# Patient Record
Sex: MALE | Race: WHITE | NOT HISPANIC OR LATINO | Employment: FULL TIME | URBAN - METROPOLITAN AREA
[De-identification: names, ages, dates, MRNs, and addresses within clinical notes are randomized per-mention and may not be internally consistent; named-entity substitution may affect disease eponyms.]

---

## 2023-04-10 ENCOUNTER — TRANSFERRED RECORDS (OUTPATIENT)
Dept: HEALTH INFORMATION MANAGEMENT | Facility: CLINIC | Age: 48
End: 2023-04-10

## 2023-06-08 ENCOUNTER — MEDICAL CORRESPONDENCE (OUTPATIENT)
Dept: HEALTH INFORMATION MANAGEMENT | Facility: CLINIC | Age: 48
End: 2023-06-08
Payer: COMMERCIAL

## 2023-06-21 ENCOUNTER — TRANSCRIBE ORDERS (OUTPATIENT)
Dept: OTHER | Age: 48
End: 2023-06-21

## 2023-06-21 DIAGNOSIS — S43.431A SUPERIOR LABRUM ANTERIOR-TO-POSTERIOR (SLAP) TEAR OF RIGHT SHOULDER: Primary | ICD-10-CM

## 2023-06-21 DIAGNOSIS — S43.432A SUPERIOR LABRUM ANTERIOR-TO-POSTERIOR (SLAP) TEAR OF LEFT SHOULDER: Primary | ICD-10-CM

## 2025-04-03 ENCOUNTER — TELEPHONE (OUTPATIENT)
Dept: ORTHOPEDICS | Facility: CLINIC | Age: 50
End: 2025-04-03
Payer: COMMERCIAL

## 2025-04-03 NOTE — TELEPHONE ENCOUNTER
CHARLES LVM for patient offering to help schedule an appointment with Dr. Zenon Stern for his shoulder per Dr. Stern's request.     ATC provided call back number of [phone].    CHARLES Delaney

## 2025-04-22 NOTE — TELEPHONE ENCOUNTER
DIAGNOSIS:   Superior labrum anterior-to-posterior (SLAP) tear of right shoulder [S43.431A]  - Primary   APPOINTMENT DATE: 05/02/2025   NOTES STATUS DETAILS   OFFICE NOTE from referring provider Care Everywhere Desi Templeton MD  2014 [facility]   MRI In process Ratyus:  05/25/2023 - RT Shoulder   XRAYS (IMAGES & REPORTS) In process Rayus:  05/25/2023 - RT Shoulder

## 2025-04-27 ENCOUNTER — HEALTH MAINTENANCE LETTER (OUTPATIENT)
Age: 50
End: 2025-04-27

## 2025-05-01 DIAGNOSIS — M25.511 RIGHT SHOULDER PAIN: Primary | ICD-10-CM

## 2025-05-02 ENCOUNTER — OFFICE VISIT (OUTPATIENT)
Dept: ORTHOPEDICS | Facility: CLINIC | Age: 50
End: 2025-05-02
Payer: COMMERCIAL

## 2025-05-02 ENCOUNTER — ANCILLARY PROCEDURE (OUTPATIENT)
Dept: GENERAL RADIOLOGY | Facility: CLINIC | Age: 50
End: 2025-05-02
Attending: ORTHOPAEDIC SURGERY
Payer: COMMERCIAL

## 2025-05-02 ENCOUNTER — PRE VISIT (OUTPATIENT)
Dept: ORTHOPEDICS | Facility: CLINIC | Age: 50
End: 2025-05-02

## 2025-05-02 DIAGNOSIS — G89.29 CHRONIC RIGHT SHOULDER PAIN: Primary | ICD-10-CM

## 2025-05-02 DIAGNOSIS — M25.511 RIGHT SHOULDER PAIN: ICD-10-CM

## 2025-05-02 DIAGNOSIS — M25.511 CHRONIC RIGHT SHOULDER PAIN: Primary | ICD-10-CM

## 2025-05-02 PROCEDURE — 99204 OFFICE O/P NEW MOD 45 MIN: CPT | Performed by: ORTHOPAEDIC SURGERY

## 2025-05-02 PROCEDURE — 73030 X-RAY EXAM OF SHOULDER: CPT | Mod: RT | Performed by: RADIOLOGY

## 2025-05-02 PROCEDURE — 1125F AMNT PAIN NOTED PAIN PRSNT: CPT | Performed by: ORTHOPAEDIC SURGERY

## 2025-05-02 NOTE — LETTER
5/2/2025      Tip Bell  [address] [place] [id number]      Dear Colleague,    Thank you for referring your patient, Tip Bell, to the [facility] [address] [place]. Please see a copy of my visit note below.    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  He has had symptoms on and off for 5 years.  In November he was bench pressing and felt a pop with increased pain.  His pain is anterior.  Radiates into his biceps.  It is worse with activity.  He does have some mechanical symptoms.  He has not had a dislocation or subluxation.  His shoulder does not feel unstable to him.  He has tried physical therapy.  He has had a corticosteroid injection.    He denies significant neck pain.  Denies numbness or tingling.    PAST MEDICAL HISTORY: (Reviewed with the patient and in the [facility] medical record)  Hypertension    PAST SURGICAL HISTORY: (Reviewed with the patient and in the [facility] medical record)  Back surgery    MEDICATIONS: (Reviewed with the patient and in the [facility] medical record)    ALLERGIES: (Reviewed with the patient and in the [facility] medical record)  No known drug allergies      SOCIAL HISTORY: (Reviewed with the patient and in the medical record)  --Tobacco: None  --Occupation:   --Sport: Weightlifting    FAMILY HISTORY: (Reviewed with the patient and in the medical record)  -- No family history of bleeding, clotting, or difficulty with anesthesia    REVIEW OF SYSTEMS: (Reviewed with the patient and on the health intake form)  -- A comprehensive 10 point review of systems was conducted and is negative except as noted in the HPI    PHYSICAL EXAMINATION:     General: Awake, Alert and Oriented, No acute Distress. Articulate and Interactive    Cervical spine non-tender, full range of motion. The Spurling test is negative.    Sensation intact  to touch lateral arm, lateral forearm, and digits.  Deltoid, biceps, triceps, and distal hand musculature fire  Pulses 2+ and capillary refill is brisk    Shoulder Examination:  On symmetrical range of motion of the shoulder.  Nontender at the AC joint.  Nontender over the rotator cuff.  Markedly tender in the biceps groove.  5/5 rotator cuff strength.    Negative crossarm.  Positive Glendale's.  Positive speeds.  Positive Yergason's.  Slightly positive Neer.  Positive Torre.  Negative apprehension.  Negative posterior apprehension.    IMAGING:    Plain Radiographs: We reviewed patient's radiographs and radiographic report from today.  The glenohumeral joint is well located.  Cartilage space is well-maintained.  There is no obvious fracture or loose body noted.    MRI: I reviewed the patient's MRI and MRI report from May 25, 2023.  The patient does have a high grade partial-thickness rotator cuff tear of the supraspinatus.  There is degenerative tearing of the superior and posterior superior labrum.  There does appear to be tearing into the biceps tendon.  There is chondral wear of the glenohumeral joint.    IMPRESSION:  Tip is a very pleasant 49-year-old active male with a long history of right shoulder pain.  He has partial-thickness rotator cuff tearing and degenerative labral tearing as well as some chondral wear.  I do think this is resulting in inflammation of his biceps tendon.  His AC joint is asymptomatic.  We had a long conversation today about options.  We discussed continued nonsurgical as well as surgical intervention.  He is a bit frustrated.  Quite limited by shoulder pain and this failed prolonged physical therapy and injections.  We discussed operative intervention.  I explained that we would evaluate the shoulder arthroscopically and determine the extent of involvement of his rotator cuff.  If his rotator cuff is involved more than 50% of the thickness we would most likely perform a rotator  cuff repair.  Otherwise we would perform a debridement.  We would also debride the degenerative labral tearing and perform an open subpectoral biceps tenodesis.  I drew pictures to illustrate the procedure.  We discussed the surgical risks including bleeding infection nerve damage complications from anesthesia blood clot etc.  We also discussed the more pertinent risks with this type of surgery including failure to relieve his pain.  We may make his pain worse.  He may lose range of motion or develop scar tissue that could be problematic.  There could be implant complications.  There is a possibility that he ends up with a biceps deformity.  I did explain that the surgery does not reverse arthritis.  This may progress over time and further treatment may be necessary.  His questions were answered.  He understands and would like to proceed.    PLAN:  We will schedule him for right shoulder arthroscopy rotator cuff repair versus debridement, labral debridement and open subpectoral biceps tenodesis.  This will be done in the beachchair position as a same-day surgical procedure.  Trace anesthesia.    CC: Desi Owens MD    Again, thank you for allowing me to participate in the care of your patient.        Sincerely,        Zenon Stern MD    Electronically signed

## 2025-05-10 NOTE — PROGRESS NOTES
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  He has had symptoms on and off for 5 years.  In November he was bench pressing and felt a pop with increased pain.  His pain is anterior.  Radiates into his biceps.  It is worse with activity.  He does have some mechanical symptoms.  He has not had a dislocation or subluxation.  His shoulder does not feel unstable to him.  He has tried physical therapy.  He has had a corticosteroid injection.    He denies significant neck pain.  Denies numbness or tingling.    PAST MEDICAL HISTORY: (Reviewed with the patient and in the [facility] medical record)  Hypertension    PAST SURGICAL HISTORY: (Reviewed with the patient and in the [facility] medical record)  Back surgery    MEDICATIONS: (Reviewed with the patient and in the [facility] medical record)    ALLERGIES: (Reviewed with the patient and in the [facility] medical record)  No known drug allergies      SOCIAL HISTORY: (Reviewed with the patient and in the medical record)  --Tobacco: None  --Occupation:   --Sport: Weightlifting    FAMILY HISTORY: (Reviewed with the patient and in the medical record)  -- No family history of bleeding, clotting, or difficulty with anesthesia    REVIEW OF SYSTEMS: (Reviewed with the patient and on the health intake form)  -- A comprehensive 10 point review of systems was conducted and is negative except as noted in the HPI    PHYSICAL EXAMINATION:     General: Awake, Alert and Oriented, No acute Distress. Articulate and Interactive    Cervical spine non-tender, full range of motion. The Spurling test is negative.    Sensation intact to touch lateral arm, lateral forearm, and digits.  Deltoid, biceps, triceps, and distal hand musculature fire  Pulses 2+ and capillary refill is brisk    Shoulder Examination:  On symmetrical range of motion of the shoulder.  Nontender at the AC joint.   Nontender over the rotator cuff.  Markedly tender in the biceps groove.  5/5 rotator cuff strength.    Negative crossarm.  Positive Cameron's.  Positive speeds.  Positive Yergason's.  Slightly positive Neer.  Positive Torre.  Negative apprehension.  Negative posterior apprehension.    IMAGING:    Plain Radiographs: We reviewed patient's radiographs and radiographic report from today.  The glenohumeral joint is well located.  Cartilage space is well-maintained.  There is no obvious fracture or loose body noted.    MRI: I reviewed the patient's MRI and MRI report from May 25, 2023.  The patient does have a high grade partial-thickness rotator cuff tear of the supraspinatus.  There is degenerative tearing of the superior and posterior superior labrum.  There does appear to be tearing into the biceps tendon.  There is chondral wear of the glenohumeral joint.    IMPRESSION:  Tip is a very pleasant 49-year-old active male with a long history of right shoulder pain.  He has partial-thickness rotator cuff tearing and degenerative labral tearing as well as some chondral wear.  I do think this is resulting in inflammation of his biceps tendon.  His AC joint is asymptomatic.  We had a long conversation today about options.  We discussed continued nonsurgical as well as surgical intervention.  He is a bit frustrated.  Quite limited by shoulder pain and this failed prolonged physical therapy and injections.  We discussed operative intervention.  I explained that we would evaluate the shoulder arthroscopically and determine the extent of involvement of his rotator cuff.  If his rotator cuff is involved more than 50% of the thickness we would most likely perform a rotator cuff repair.  Otherwise we would perform a debridement.  We would also debride the degenerative labral tearing and perform an open subpectoral biceps tenodesis.  I drew pictures to illustrate the procedure.  We discussed the surgical risks including  bleeding infection nerve damage complications from anesthesia blood clot etc.  We also discussed the more pertinent risks with this type of surgery including failure to relieve his pain.  We may make his pain worse.  He may lose range of motion or develop scar tissue that could be problematic.  There could be implant complications.  There is a possibility that he ends up with a biceps deformity.  I did explain that the surgery does not reverse arthritis.  This may progress over time and further treatment may be necessary.  His questions were answered.  He understands and would like to proceed.    PLAN:  We will schedule him for right shoulder arthroscopy rotator cuff repair versus debridement, labral debridement and open subpectoral biceps tenodesis.  This will be done in the beachchair position as a same-day surgical procedure.  Trace anesthesia.    CC: Desi Owens MD

## 2025-05-12 ENCOUNTER — MYC MEDICAL ADVICE (OUTPATIENT)
Dept: ORTHOPEDICS | Facility: CLINIC | Age: 50
End: 2025-05-12
Payer: COMMERCIAL

## 2025-05-14 ENCOUNTER — DOCUMENTATION ONLY (OUTPATIENT)
Dept: ORTHOPEDICS | Facility: CLINIC | Age: 50
End: 2025-05-14
Payer: COMMERCIAL

## 2025-05-14 DIAGNOSIS — Z98.890 S/P ARTHROSCOPY OF RIGHT SHOULDER: Primary | ICD-10-CM

## 2025-05-14 NOTE — PROGRESS NOTES
Teaching Flowsheet     Visit Type: In Clinic during visit on 5/2/2025 with Dr. Stern.     Time Start: 9:20am   Time End: 9:40am  Total Time Spent: 20 min.    Surgeon: Dr. Zenon Stern   Location of Surgery (known or anticipated): [facility]  or [facility]  Type of Anesthesia: General  Worker's Compensation Procedure: No    Pertinent Medical History: Previous Lumbar Surgery 2024  Were medical conditions reviewed and appropriate for location? Yes  BMI: Normal BMI    Relevant Diagnosis: Right Shoulder Rotator Cuff Tear  Teaching Topic: Right shoulder arthroscopy with open subpectoral biceps tenodesis, rotator cuff repair vs debridement,     Person(s) involved in teaching:   Patient  : No.   Verified Patient's Phone Number: YES: [phone]    Caregiver//  Name: Devan Bell  Phone Number: [phone]   Relationship: Father  Consent to Communicate on file: Yes  Authorization to Share Protected Health Information- Person to person communication signed by patient and authorized the following person or people:      Motivation Level:  Asks Questions: Yes  Eager to Learn: Yes  Cooperative: Yes  Receptive (willing/able to accept information): Yes  Any cultural factors/Taoism beliefs that may influence understanding or compliance? No     Patient demonstrates understanding of the following:  Reason for the appointment, diagnosis and treatment plan: Yes  Knowledge of proper use of medications and conditions for which they are ordered (with special attention to potential side effects or drug interactions): Yes  Which situations necessitate calling provider and whom to contact: Yes     Teaching Concerns Addressed:   Proper use and care of medical equip, care aids, etc.: Yes  Nutritional needs and diet plan: Yes  Pain management techniques: Yes  Wound Care: Yes  How and/when to access community resources: Yes  Need for pre-op with in 30 days: YES, will be done with PCP. I asked them to  ensure they go over their daily medications during this visit and discuss what medications need to be stopped before surgery and when. If you are doing a pre-op with your PCP and they are not within the [facility], I ask them to fax it to our pre-op office. Patient verbalized understanding.       Does patient have difficulty getting a ride to appointments (post-ops, PT/OT): No  Patient's plan after discharge: home with family or spouse     Instructional Materials Used/Given:  two bottles of chlorhexidine soap and a surgery packet given to patient in clinic. Instructed patient to buy or get two 8 ounces bottles of antiseptic surgical soap called 4% CHG. Common name brand of this soap are Hibiclens and Exidine. I told them they can find this at their local pharmacy, clinic or retail store. If they have trouble finding it, I told them to ask their pharmacist to help them find a substitute.   - Important Contact Info/Phone Numbers: emphasizing clinic number [phone] and after hours number [phone]  - Map/location of surgery and follow-up appointments  - Showering instructions  - Stoplight Tool     -Next step: surgery scheduled for 6/11/2025 and schedule a pre-op with PCP.  - Patient will schedule pre-op with PCP at [facility].  - Patient will schedule physical therapy at [facility] in [place], [place] with Blanka Gomez.    Elaina Huffman, ATC

## 2025-05-19 ENCOUNTER — MYC MEDICAL ADVICE (OUTPATIENT)
Dept: ORTHOPEDICS | Facility: CLINIC | Age: 50
End: 2025-05-19
Payer: COMMERCIAL

## 2025-05-29 NOTE — TELEPHONE ENCOUNTER
Patient is scheduled for surgery with Dr. Stern    Spoke with: patient     Reason for Surgical Date: patient choice    Date of Surgery: 6/11/25    Estimated Arrival time Discussed with Patient:  No    [facility]     Pre-Op H&P: 6/2/[facility]    Imaging: No     Additional Appointments: No     Post-Op Appt Date w/ NP/PA: to be scheduled by care team    Post-Op Appt Date w/ Surgeon: to be scheduled by care team     Discussed with patient PAC RN will provide arrival time and instructions for surgery at the time of the appointment: [[place] locations only]: Yes    Standard Surgery Packet Sent: Yes 05/29/25  via [email or web address] Message    Additional Comments: N/A    Yessica Valerio on 5/29/2025 at 12:53 PM

## 2025-06-09 RX ORDER — METHOCARBAMOL 750 MG/1
750 TABLET, FILM COATED ORAL 4 TIMES DAILY PRN
Status: ON HOLD | COMMUNITY
End: 2025-06-11

## 2025-06-09 RX ORDER — LORATADINE 10 MG/1
10 TABLET ORAL DAILY PRN
COMMUNITY

## 2025-06-09 RX ORDER — LOSARTAN POTASSIUM 50 MG/1
50 TABLET ORAL DAILY
COMMUNITY

## 2025-06-10 ENCOUNTER — ANESTHESIA EVENT (OUTPATIENT)
Dept: SURGERY | Facility: CLINIC | Age: 50
End: 2025-06-10
Payer: COMMERCIAL

## 2025-06-11 ENCOUNTER — HOSPITAL ENCOUNTER (OUTPATIENT)
Facility: CLINIC | Age: 50
Discharge: HOME | End: 2025-06-11
Attending: ORTHOPAEDIC SURGERY | Admitting: ORTHOPAEDIC SURGERY
Payer: COMMERCIAL

## 2025-06-11 ENCOUNTER — ANESTHESIA (OUTPATIENT)
Dept: SURGERY | Facility: CLINIC | Age: 50
End: 2025-06-11
Payer: COMMERCIAL

## 2025-06-11 VITALS
TEMPERATURE: 97.5 F | OXYGEN SATURATION: 98 % | DIASTOLIC BLOOD PRESSURE: 97 MMHG | BODY MASS INDEX: 31.78 KG/M2 | HEIGHT: 70 IN | WEIGHT: 222 LBS | SYSTOLIC BLOOD PRESSURE: 146 MMHG | HEART RATE: 59 BPM | RESPIRATION RATE: 15 BRPM

## 2025-06-11 DIAGNOSIS — Z98.890 S/P SHOULDER SURGERY: Primary | ICD-10-CM

## 2025-06-11 PROBLEM — N18.30 CRF (CHRONIC RENAL FAILURE), STAGE 3 (MODERATE) (H): Status: ACTIVE | Noted: 2020-08-27

## 2025-06-11 PROBLEM — D22.9 MULTIPLE BENIGN NEVI: Status: ACTIVE | Noted: 2020-08-27

## 2025-06-11 PROBLEM — C43.9 MALIGNANT MELANOMA, UNSPECIFIED SITE (H): Status: ACTIVE | Noted: 2020-08-27

## 2025-06-11 PROCEDURE — 360N000077 HC SURGERY LEVEL 4, PER MIN: Performed by: ORTHOPAEDIC SURGERY

## 2025-06-11 PROCEDURE — 258N000003 HC RX IP 258 OP 636: Performed by: NURSE ANESTHETIST, CERTIFIED REGISTERED

## 2025-06-11 PROCEDURE — 29827 SHO ARTHRS SRG RT8TR CUF RPR: CPT | Mod: RT | Performed by: ORTHOPAEDIC SURGERY

## 2025-06-11 PROCEDURE — 250N000009 HC RX 250: Performed by: ORTHOPAEDIC SURGERY

## 2025-06-11 PROCEDURE — 29823 SHO ARTHRS SRG XTNSV DBRDMT: CPT | Mod: RT | Performed by: ORTHOPAEDIC SURGERY

## 2025-06-11 PROCEDURE — 250N000013 HC RX MED GY IP 250 OP 250 PS 637: Performed by: STUDENT IN AN ORGANIZED HEALTH CARE EDUCATION/TRAINING PROGRAM

## 2025-06-11 PROCEDURE — 250N000011 HC RX IP 250 OP 636: Performed by: NURSE ANESTHETIST, CERTIFIED REGISTERED

## 2025-06-11 PROCEDURE — 272N000002 HC OR SUPPLY OTHER OPNP: Performed by: ORTHOPAEDIC SURGERY

## 2025-06-11 PROCEDURE — 250N000011 HC RX IP 250 OP 636: Performed by: ORTHOPAEDIC SURGERY

## 2025-06-11 PROCEDURE — 250N000011 HC RX IP 250 OP 636: Performed by: STUDENT IN AN ORGANIZED HEALTH CARE EDUCATION/TRAINING PROGRAM

## 2025-06-11 PROCEDURE — 250N000009 HC RX 250: Performed by: NURSE ANESTHETIST, CERTIFIED REGISTERED

## 2025-06-11 PROCEDURE — 710N000010 HC RECOVERY PHASE 1, LEVEL 2, PER MIN: Performed by: ORTHOPAEDIC SURGERY

## 2025-06-11 PROCEDURE — 999N000141 HC STATISTIC PRE-PROCEDURE NURSING ASSESSMENT: Performed by: ORTHOPAEDIC SURGERY

## 2025-06-11 PROCEDURE — 710N000012 HC RECOVERY PHASE 2, PER MINUTE: Performed by: ORTHOPAEDIC SURGERY

## 2025-06-11 PROCEDURE — C1713 ANCHOR/SCREW BN/BN,TIS/BN: HCPCS | Performed by: ORTHOPAEDIC SURGERY

## 2025-06-11 PROCEDURE — 250N000009 HC RX 250: Performed by: STUDENT IN AN ORGANIZED HEALTH CARE EDUCATION/TRAINING PROGRAM

## 2025-06-11 PROCEDURE — 258N000001 HC RX 258: Performed by: ORTHOPAEDIC SURGERY

## 2025-06-11 PROCEDURE — 370N000017 HC ANESTHESIA TECHNICAL FEE, PER MIN: Performed by: ORTHOPAEDIC SURGERY

## 2025-06-11 PROCEDURE — 272N000001 HC OR GENERAL SUPPLY STERILE: Performed by: ORTHOPAEDIC SURGERY

## 2025-06-11 PROCEDURE — 23430 REPAIR BICEPS TENDON: CPT | Mod: RT | Performed by: ORTHOPAEDIC SURGERY

## 2025-06-11 PROCEDURE — 258N000003 HC RX IP 258 OP 636: Performed by: STUDENT IN AN ORGANIZED HEALTH CARE EDUCATION/TRAINING PROGRAM

## 2025-06-11 PROCEDURE — 271N000001 HC OR GENERAL SUPPLY NON-STERILE: Performed by: ORTHOPAEDIC SURGERY

## 2025-06-11 DEVICE — DBL LOADED 4.75MM BIO-COMP SWVLK
Type: IMPLANTABLE DEVICE | Site: SHOULDER | Status: FUNCTIONAL
Brand: ARTHREX®

## 2025-06-11 DEVICE — KNEE FIBERTAK BUTTON
Type: IMPLANTABLE DEVICE | Site: SHOULDER | Status: FUNCTIONAL
Brand: ARTHREX®

## 2025-06-11 DEVICE — KL FBTK RC, (WH/BLK)TT & (BLU)#2 MTS
Type: IMPLANTABLE DEVICE | Site: SHOULDER | Status: FUNCTIONAL
Brand: ARTHREX®

## 2025-06-11 DEVICE — KL FBTK RC W/ (BLU)FT & (W/BLK)#2 MTS
Type: IMPLANTABLE DEVICE | Site: SHOULDER | Status: FUNCTIONAL
Brand: ARTHREX®

## 2025-06-11 RX ORDER — LABETALOL 20 MG/4 ML (5 MG/ML) INTRAVENOUS SYRINGE
PRN
Status: DISCONTINUED | OUTPATIENT
Start: 2025-06-11 | End: 2025-06-11

## 2025-06-11 RX ORDER — HYDROMORPHONE HYDROCHLORIDE 1 MG/ML
0.2 INJECTION, SOLUTION INTRAMUSCULAR; INTRAVENOUS; SUBCUTANEOUS EVERY 5 MIN PRN
Status: DISCONTINUED | OUTPATIENT
Start: 2025-06-11 | End: 2025-06-11 | Stop reason: HOSPADM

## 2025-06-11 RX ORDER — PROPOFOL 10 MG/ML
INJECTION, EMULSION INTRAVENOUS PRN
Status: DISCONTINUED | OUTPATIENT
Start: 2025-06-11 | End: 2025-06-11

## 2025-06-11 RX ORDER — NALOXONE HYDROCHLORIDE 0.4 MG/ML
0.4 INJECTION, SOLUTION INTRAMUSCULAR; INTRAVENOUS; SUBCUTANEOUS
Status: DISCONTINUED | OUTPATIENT
Start: 2025-06-11 | End: 2025-06-11 | Stop reason: HOSPADM

## 2025-06-11 RX ORDER — LIDOCAINE HYDROCHLORIDE 20 MG/ML
INJECTION, SOLUTION INFILTRATION; PERINEURAL PRN
Status: DISCONTINUED | OUTPATIENT
Start: 2025-06-11 | End: 2025-06-11

## 2025-06-11 RX ORDER — NALOXONE HYDROCHLORIDE 0.4 MG/ML
0.1 INJECTION, SOLUTION INTRAMUSCULAR; INTRAVENOUS; SUBCUTANEOUS
Status: DISCONTINUED | OUTPATIENT
Start: 2025-06-11 | End: 2025-06-11 | Stop reason: HOSPADM

## 2025-06-11 RX ORDER — HYDROXYZINE HYDROCHLORIDE 25 MG/1
25 TABLET, FILM COATED ORAL
Status: CANCELLED | OUTPATIENT
Start: 2025-06-11

## 2025-06-11 RX ORDER — ONDANSETRON 2 MG/ML
INJECTION INTRAMUSCULAR; INTRAVENOUS PRN
Status: DISCONTINUED | OUTPATIENT
Start: 2025-06-11 | End: 2025-06-11

## 2025-06-11 RX ORDER — ONDANSETRON 4 MG/1
4 TABLET, ORALLY DISINTEGRATING ORAL EVERY 8 HOURS PRN
Qty: 8 TABLET | Refills: 0 | Status: SHIPPED | OUTPATIENT
Start: 2025-06-11

## 2025-06-11 RX ORDER — ACETAMINOPHEN 325 MG/1
650 TABLET ORAL EVERY 4 HOURS PRN
Qty: 50 TABLET | Refills: 0 | Status: SHIPPED | OUTPATIENT
Start: 2025-06-11

## 2025-06-11 RX ORDER — IBUPROFEN 600 MG/1
600 TABLET, FILM COATED ORAL EVERY 6 HOURS PRN
Qty: 30 TABLET | Refills: 0 | Status: SHIPPED | OUTPATIENT
Start: 2025-06-11

## 2025-06-11 RX ORDER — FENTANYL CITRATE 50 UG/ML
50 INJECTION, SOLUTION INTRAMUSCULAR; INTRAVENOUS EVERY 5 MIN PRN
Status: DISCONTINUED | OUTPATIENT
Start: 2025-06-11 | End: 2025-06-11 | Stop reason: HOSPADM

## 2025-06-11 RX ORDER — BUPIVACAINE HCL/EPINEPHRINE 0.25-.0005
VIAL (ML) INJECTION PRN
Status: DISCONTINUED | OUTPATIENT
Start: 2025-06-11 | End: 2025-06-11 | Stop reason: HOSPADM

## 2025-06-11 RX ORDER — OXYCODONE HYDROCHLORIDE 5 MG/1
5-10 TABLET ORAL EVERY 4 HOURS PRN
Qty: 26 TABLET | Refills: 0 | Status: SHIPPED | OUTPATIENT
Start: 2025-06-11

## 2025-06-11 RX ORDER — GABAPENTIN 100 MG/1
300 CAPSULE ORAL
Status: COMPLETED | OUTPATIENT
Start: 2025-06-11 | End: 2025-06-11

## 2025-06-11 RX ORDER — HYDROMORPHONE HYDROCHLORIDE 1 MG/ML
0.4 INJECTION, SOLUTION INTRAMUSCULAR; INTRAVENOUS; SUBCUTANEOUS EVERY 5 MIN PRN
Status: DISCONTINUED | OUTPATIENT
Start: 2025-06-11 | End: 2025-06-11 | Stop reason: HOSPADM

## 2025-06-11 RX ORDER — SODIUM CHLORIDE, SODIUM LACTATE, POTASSIUM CHLORIDE, CALCIUM CHLORIDE 600; 310; 30; 20 MG/100ML; MG/100ML; MG/100ML; MG/100ML
INJECTION, SOLUTION INTRAVENOUS CONTINUOUS
Status: DISCONTINUED | OUTPATIENT
Start: 2025-06-11 | End: 2025-06-11 | Stop reason: HOSPADM

## 2025-06-11 RX ORDER — ACETAMINOPHEN 325 MG/1
975 TABLET ORAL ONCE
Status: DISCONTINUED | OUTPATIENT
Start: 2025-06-11 | End: 2025-06-11 | Stop reason: HOSPADM

## 2025-06-11 RX ORDER — NALOXONE HYDROCHLORIDE 0.4 MG/ML
0.2 INJECTION, SOLUTION INTRAMUSCULAR; INTRAVENOUS; SUBCUTANEOUS
Status: DISCONTINUED | OUTPATIENT
Start: 2025-06-11 | End: 2025-06-11 | Stop reason: HOSPADM

## 2025-06-11 RX ORDER — ACETAMINOPHEN 325 MG/1
975 TABLET ORAL ONCE
Status: COMPLETED | OUTPATIENT
Start: 2025-06-11 | End: 2025-06-11

## 2025-06-11 RX ORDER — HYDRALAZINE HYDROCHLORIDE 20 MG/ML
2.5-5 INJECTION INTRAMUSCULAR; INTRAVENOUS EVERY 10 MIN PRN
Status: DISCONTINUED | OUTPATIENT
Start: 2025-06-11 | End: 2025-06-11 | Stop reason: HOSPADM

## 2025-06-11 RX ORDER — ONDANSETRON 4 MG/1
4 TABLET, ORALLY DISINTEGRATING ORAL
Status: CANCELLED | OUTPATIENT
Start: 2025-06-11

## 2025-06-11 RX ORDER — FLUMAZENIL 0.1 MG/ML
0.2 INJECTION, SOLUTION INTRAVENOUS
Status: DISCONTINUED | OUTPATIENT
Start: 2025-06-11 | End: 2025-06-11 | Stop reason: HOSPADM

## 2025-06-11 RX ORDER — CEFAZOLIN SODIUM/WATER 2 G/20 ML
2 SYRINGE (ML) INTRAVENOUS
Status: COMPLETED | OUTPATIENT
Start: 2025-06-11 | End: 2025-06-11

## 2025-06-11 RX ORDER — OXYCODONE HYDROCHLORIDE 5 MG/1
5 TABLET ORAL
Refills: 0 | Status: CANCELLED | OUTPATIENT
Start: 2025-06-11

## 2025-06-11 RX ORDER — HYDROXYZINE HYDROCHLORIDE 10 MG/1
10 TABLET, FILM COATED ORAL
Status: CANCELLED | OUTPATIENT
Start: 2025-06-11

## 2025-06-11 RX ORDER — AMOXICILLIN 250 MG
1-2 CAPSULE ORAL 2 TIMES DAILY
Qty: 30 TABLET | Refills: 0 | Status: SHIPPED | OUTPATIENT
Start: 2025-06-11

## 2025-06-11 RX ORDER — BUPIVACAINE HCL/EPINEPHRINE 0.5-1:200K
VIAL (ML) INJECTION
Status: COMPLETED | OUTPATIENT
Start: 2025-06-11 | End: 2025-06-11

## 2025-06-11 RX ORDER — CEFAZOLIN SODIUM/WATER 2 G/20 ML
2 SYRINGE (ML) INTRAVENOUS SEE ADMIN INSTRUCTIONS
Status: DISCONTINUED | OUTPATIENT
Start: 2025-06-11 | End: 2025-06-11 | Stop reason: HOSPADM

## 2025-06-11 RX ORDER — HYDROXYZINE PAMOATE 25 MG/1
25 CAPSULE ORAL 3 TIMES DAILY PRN
Qty: 30 CAPSULE | Refills: 0 | Status: SHIPPED | OUTPATIENT
Start: 2025-06-11

## 2025-06-11 RX ORDER — PROPOFOL 10 MG/ML
INJECTION, EMULSION INTRAVENOUS CONTINUOUS PRN
Status: DISCONTINUED | OUTPATIENT
Start: 2025-06-11 | End: 2025-06-11

## 2025-06-11 RX ORDER — FENTANYL CITRATE 50 UG/ML
INJECTION, SOLUTION INTRAMUSCULAR; INTRAVENOUS PRN
Status: DISCONTINUED | OUTPATIENT
Start: 2025-06-11 | End: 2025-06-11

## 2025-06-11 RX ORDER — HALOPERIDOL 5 MG/ML
1 INJECTION INTRAMUSCULAR
Status: DISCONTINUED | OUTPATIENT
Start: 2025-06-11 | End: 2025-06-11 | Stop reason: HOSPADM

## 2025-06-11 RX ORDER — LABETALOL HYDROCHLORIDE 5 MG/ML
10 INJECTION, SOLUTION INTRAVENOUS
Status: DISCONTINUED | OUTPATIENT
Start: 2025-06-11 | End: 2025-06-11 | Stop reason: HOSPADM

## 2025-06-11 RX ORDER — FENTANYL CITRATE 50 UG/ML
25-50 INJECTION, SOLUTION INTRAMUSCULAR; INTRAVENOUS
Status: DISCONTINUED | OUTPATIENT
Start: 2025-06-11 | End: 2025-06-11 | Stop reason: HOSPADM

## 2025-06-11 RX ORDER — FENTANYL CITRATE 50 UG/ML
25 INJECTION, SOLUTION INTRAMUSCULAR; INTRAVENOUS EVERY 5 MIN PRN
Status: DISCONTINUED | OUTPATIENT
Start: 2025-06-11 | End: 2025-06-11 | Stop reason: HOSPADM

## 2025-06-11 RX ORDER — LIDOCAINE 40 MG/G
CREAM TOPICAL
Status: DISCONTINUED | OUTPATIENT
Start: 2025-06-11 | End: 2025-06-11 | Stop reason: HOSPADM

## 2025-06-11 RX ORDER — IBUPROFEN 600 MG/1
600 TABLET, FILM COATED ORAL
Status: CANCELLED | OUTPATIENT
Start: 2025-06-11

## 2025-06-11 RX ORDER — SODIUM CHLORIDE, SODIUM LACTATE, POTASSIUM CHLORIDE, CALCIUM CHLORIDE 600; 310; 30; 20 MG/100ML; MG/100ML; MG/100ML; MG/100ML
INJECTION, SOLUTION INTRAVENOUS CONTINUOUS PRN
Status: DISCONTINUED | OUTPATIENT
Start: 2025-06-11 | End: 2025-06-11

## 2025-06-11 RX ADMIN — LABETALOL 20 MG/4 ML (5 MG/ML) INTRAVENOUS SYRINGE 10 MG: at 13:52

## 2025-06-11 RX ADMIN — DEXMEDETOMIDINE HYDROCHLORIDE 8 MCG: 100 INJECTION, SOLUTION INTRAVENOUS at 12:02

## 2025-06-11 RX ADMIN — Medication 2 G: at 12:02

## 2025-06-11 RX ADMIN — SODIUM CHLORIDE, SODIUM LACTATE, POTASSIUM CHLORIDE, AND CALCIUM CHLORIDE: .6; .31; .03; .02 INJECTION, SOLUTION INTRAVENOUS at 12:02

## 2025-06-11 RX ADMIN — PHENYLEPHRINE HYDROCHLORIDE 100 MCG: 10 INJECTION INTRAVENOUS at 12:48

## 2025-06-11 RX ADMIN — FENTANYL CITRATE 50 MCG: 50 INJECTION INTRAMUSCULAR; INTRAVENOUS at 10:27

## 2025-06-11 RX ADMIN — LABETALOL 20 MG/4 ML (5 MG/ML) INTRAVENOUS SYRINGE 10 MG: at 14:01

## 2025-06-11 RX ADMIN — LIDOCAINE HYDROCHLORIDE 100 MG: 20 INJECTION, SOLUTION INFILTRATION; PERINEURAL at 12:12

## 2025-06-11 RX ADMIN — ACETAMINOPHEN 975 MG: 325 TABLET ORAL at 09:40

## 2025-06-11 RX ADMIN — BUPIVACAINE 10 ML: 13.3 INJECTION, SUSPENSION, LIPOSOMAL INFILTRATION at 10:15

## 2025-06-11 RX ADMIN — DEXMEDETOMIDINE HYDROCHLORIDE 12 MCG: 100 INJECTION, SOLUTION INTRAVENOUS at 12:04

## 2025-06-11 RX ADMIN — GABAPENTIN 300 MG: 300 CAPSULE ORAL at 09:40

## 2025-06-11 RX ADMIN — PHENYLEPHRINE HYDROCHLORIDE 100 MCG: 10 INJECTION INTRAVENOUS at 12:37

## 2025-06-11 RX ADMIN — ONDANSETRON 4 MG: 2 INJECTION INTRAMUSCULAR; INTRAVENOUS at 13:56

## 2025-06-11 RX ADMIN — FENTANYL CITRATE 100 MCG: 50 INJECTION INTRAMUSCULAR; INTRAVENOUS at 12:12

## 2025-06-11 RX ADMIN — PHENYLEPHRINE HYDROCHLORIDE 0.3 MCG/KG/MIN: 10 INJECTION INTRAVENOUS at 13:02

## 2025-06-11 RX ADMIN — PROPOFOL 180 MG: 10 INJECTION, EMULSION INTRAVENOUS at 12:12

## 2025-06-11 RX ADMIN — PROPOFOL 200 MCG/KG/MIN: 10 INJECTION, EMULSION INTRAVENOUS at 12:12

## 2025-06-11 RX ADMIN — PHENYLEPHRINE HYDROCHLORIDE 100 MCG: 10 INJECTION INTRAVENOUS at 12:56

## 2025-06-11 RX ADMIN — BUPIVACAINE HYDROCHLORIDE AND EPINEPHRINE BITARTRATE 10 ML: 5; .005 INJECTION, SOLUTION PERINEURAL at 10:15

## 2025-06-11 RX ADMIN — MIDAZOLAM 1 MG: 1 INJECTION INTRAMUSCULAR; INTRAVENOUS at 10:27

## 2025-06-11 ASSESSMENT — ACTIVITIES OF DAILY LIVING (ADL)
ADLS_ACUITY_SCORE: 32

## 2025-06-11 ASSESSMENT — LIFESTYLE VARIABLES: TOBACCO_USE: 0

## 2025-06-11 NOTE — ANESTHESIA POSTPROCEDURE EVALUATION
Patient: iTp Bell    Procedure: Procedure(s):  ARTHROSCOPY, SHOULDER, WITH OPEN SUBPECTORAL BICEPS TENODESIS  ARTHROSCOPY, SHOULDER, WITH ROTATOR CUFF REPAIR WITH A GLENOHEMORAL JOINT DEBRIDEMENT       Anesthesia Type:  General    Note:  Disposition: Outpatient   Postop Pain Control: Uneventful            Sign Out: Well controlled pain   PONV: No   Neuro/Psych: Uneventful            Sign Out: Acceptable/Baseline neuro status   Airway/Respiratory: Uneventful            Sign Out: Acceptable/Baseline resp. status   CV/Hemodynamics: Uneventful            Sign Out: Acceptable CV status; No obvious hypovolemia; No obvious fluid overload   Other NRE: NONE   DID A NON-ROUTINE EVENT OCCUR? No           Last vitals:  Vitals Value Taken Time   /87 06/11/25 15:15   Temp 36.1  C (97  F) 06/11/25 14:30   Pulse 59 06/11/25 15:15   Resp 15 06/11/25 15:15   SpO2 99 % 06/11/25 15:18   Vitals shown include unfiled device data.    Electronically Signed By: Ayala Gómez MD  June 11, 2025  3:36 PM

## 2025-06-11 NOTE — DISCHARGE INSTRUCTIONS
To contact a doctor, call Dr. Stern's clinic at [phone]  or:     [phone] and ask for the Resident On Call for:          Orthopedics (answered 24 hours a day)   Emergency Departments:  [facility] Emergency Department: [phone]     [facility]'s Emergency Department: [phone]

## 2025-06-11 NOTE — OP NOTE
Procedure Date: 06/11/20225     PREOPERATIVE DIAGNOSES:    1.  Right shoulder superior labral pair  2   Right shoulder biceps tendinitis  3.  Right shoulder partial-thickness rotator cuff tear  4.  Right shoulder chondral wear     POSTOPERATIVE DIAGNOSES:    1.  Right shoulder superior labral pair  2   Right shoulder biceps tendinitis  3.  Right shoulder full-thickness supraspinatus rotator cuff tear  4.  Right shoulder chondral wear     SURGEON:  Zenon Stern MD     ASSISTANT:  Luz Frey PA-C.  No resident was available for assistance.  The physician assistant was necessary to retract and assist in implant placement for the biceps tenodesis.  In addition, the physician assistant was necessary for assistance in implant placement and arm positioning for the rotator cuff repair.     SECOND ASSISTANT: Joel La MD, orthopedic fellow     ANESTHETIC:  General plus regional block.     DRAINS:  None.     COUNTS:  Sponge and needle count were correct.     MATERIAL FORWARDED TO THE LAB:  None.     PROCEDURE PERFORMED:   1.  Right shoulder arthroscopic rotator cuff repair  2.  Right shoulder open subpectoral biceps tenodesis  3.  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  This is worsening.  He has a fairly significant superior labral tear and biceps tendon disease.  He also has some chondral wear.  He has a high-grade partial-thickness rotator cuff tear on MRI.  He has failed nonoperative treatment.  I put a long conversation with Father Ray regarding nonoperative and operative options.  We have decided to proceed with surgical intervention.  I explained the procedure at length.  We discussed the risks and this is documented in my clinic note.  On the day of surgery I met with the patient and answered his questions.  He understands the surgery as well as the  surgical risks and would like to proceed.    OPERATIVE FINDINGS:  Examination under anesthesia reveals full range of motion.  The diagnostic arthroscopy reveals degenerative fraying of the biceps tendon.  There is a significant type 2 superior labral tear with degenerative tearing of the superior labrum as well as the anterior and posterior labrum.  There is grade 3 chondral loss of the posterior humeral head.  There is an area of grade 2 and even grade 3 chondral wear of the posteroinferior glenoid.  The subscapularis is intact.  The anterior supraspinatus tendon has significant tearing on the articular side.  When viewed from the bursal side, the tear was essentially full-thickness and was an 18 mm crescent-shaped tear.    IMPLANTS:    Arthrex 2.6 knotless fiber tack biceps tenodesis  Arthrex 2.6 mm knotless fiber tack rotator cuff anchor x 2 medial row  Arthrex 4.75 mm BioComposite swivel lock x 2 lateral row     DESCRIPTION OF THE OPERATION:  After the patient was counseled, plans, alternatives and risks were discussed.  Consent was obtained.  The correct operative extremity was marked in the preoperative holding area.  Preoperative antibiotics were administered.  Regional block was administered.  The patient was brought back to the operating suite and administered a general anesthetic.  The examination under anesthesia was performed.  The findings are noted above.  The patient was placed in the beach chair position with bony prominences well padded.  The right upper extremity was prepped and draped in the usual sterile fashion.  A timeout process was completed.  A standard posterior arthroscopy portal was created, followed by an anterior portal for working instruments.  A thorough diagnostic arthroscopy was undertaken, and the findings are noted above.  The degenerative anterior, superior and posterior labrum were debrided back to a stable margin.  Proliferative synovitis was debrided.  Unstable chondral flaps  on the glenoid and humeral head were gently debrided.  The long head of the biceps was transected.  The stump of the tendon was debrided back to a stable margin.  The arthroscopic instruments were removed.     Attention was turned to the biceps tenodesis.  A mid axillary fold incision was created.  Hemostasis obtained via electrocautery.  Dissection carried through subcutaneous tissue and down to the pectoralis major.  The pectoralis major tendon was bluntly mobilized and retracted superiorly, exposing the bicipital groove.  The long head of the biceps was retrieved.  A #2 FiberWire was placed in the tendon 2 cm from the musculotendinous junction.  A Arvizu and a rongeur were used to remove significantly proliferative synovium from the biceps groove.  A 2.6 mm knotless fiber tack was placed just below the transverse humeral ligament.  Each suture from the end of the biceps tendon was shuttled through the implant.  1 limb of the suture was then passed back through the biceps tendon.  The sutures were tied together.  The fixation of the biceps was excellent.  The tension was excellent.       The arthroscope was reinserted into the subacromial space.  A lateral portal was created.  A thorough bursectomy was undertaken.  The patient actually had a full-thickness crescent-shaped tear.  A motorized resector and a curette were used to abrade the greater tuberosity and footprint.  The motorized resector was used to freshen the edges of the tendon.  A second lateral portal was created.  A small stab wound was created just off of the acromion and the 2.6 mm fiber tack was placed in the posterior aspect of the rotator cuff tear footprint 5 mm from the articular cartilage.  The fiber tape as well as the repair stitch were shuttled through the rotator cuff.  This was repeated with an anchor more anteriorly.  The repair stitch from 1 anchor was shuttled through the opposite anchor and vice versa.  These sutures were gently  tensioned creating an excellent medial row repair.  1 limb of each tape along with 1 repair stitch was placed through a 4.75 swivel lock and impacted laterally in the tuberosity.  This was repeated with a second swivel lock more posteriorly.  This achieved an excellent double row rotator cuff repair.  Subacromial space was lavaged, and the arthroscopic instruments were removed.  The anterior incision was irrigated and then closed in layers.  Portal sites closed with nylon suture.  A sterile dressing was applied.  The patient was placed in a shoulder immobilizer.  He was extubated on the operating room table and taken to the recovery room in good condition.  He tolerated the procedure well.  There were no complications.  Estimated blood loss was less than 10 mL.     DISPOSITION:  The patient will be discharged home through same-day surgery per protocol.  He may remove his dressing on postoperative day 3 and shower, redressing his incisions with Band-Aids and gauze.  He will wear his shoulder immobilizer for 6 weeks postop.  He will follow a standard rotator cuff rehab protocol.  He should start physical therapy within the next 1-2 weeks.  Follow up in 7-10 days for suture removal at home.  He will then follow up with me in approximately 6 weeks postop for routine recheck via a video visit.    CC:  Desi Templeton MD.

## 2025-06-11 NOTE — ANESTHESIA PREPROCEDURE EVALUATION
Anesthesia Pre-Procedure Evaluation    Patient: Tip Bell   MRN: [id number] : 1975          Procedure : Procedure(s):  ARTHROSCOPY, SHOULDER, WITH OPEN SUBPECTORAL BICEPS TENODESIS  ARTHROSCOPY, SHOULDER, WITH ROTATOR CUFF REPAIR VS DEBRIDEMENT         History reviewed. No pertinent past medical history.   History reviewed. No pertinent surgical history.   No Known Allergies   Social History     Tobacco Use    Smoking status: Never    Smokeless tobacco: Never   Substance Use Topics    Alcohol use: Yes     Comment: social      Wt Readings from Last 1 Encounters:   No data found for Wt        Anesthesia Evaluation   Pt has had prior anesthetic. Type: General.    No history of anesthetic complications       ROS/MED HX  ENT/Pulmonary: Comment: Seasonal allergies. Takes antihistaminic occasionally.    (-) tobacco use and asthma   Neurologic:  - neg neurologic ROS     Cardiovascular: Comment: ~20 yr history of borderline elevated BP. Per Preoperative H&P 2025 patient has been on clonidine in the past  -- reported BP in preop eval: 158/105 ([facility], 2025). Patient was started on Losartan 50mg/d and phone follow up reported normal BP on 2025   -- DOS 2025: patient reports BP consistently 120s/70s since starting medication. He has been compliant with losartan. Last took it 6/10/25.     (+)  - -   -  - -                                 Previous cardiac testing   Echo: Date: Results:    Stress Test:  Date: Results:    ECG Reviewed:  Date: 2025 Results:  EKG at [facility]  - Reportedly normal sinus rhythm, incomplete RBBB, rate 64, QTc 453. Tracing not available.   Cath:  Date: Results:      METS/Exercise Tolerance: 3 - Able to walk 1-2 blocks without stopping    Hematologic: Comments: Labs 2025: Hgb 14.2, Plt 181K - neg hematologic  ROS  (-) anemia   Musculoskeletal: Comment: Achilles tendinitis  Plantar fascitis & foot pain  Chronic shoulder pain + ~50%  "tear years ago  -- 6/11/2025: here for shoulder arthroscopy + subpectoral Biceps Tenodesis       GI/Hepatic:  - neg GI/hepatic ROS     Renal/Genitourinary: Comment: BMP 6/2/2025: Creat 1.25. eGFR ~70    (+) renal disease, type: CRI, Pt does not require dialysis,           Endo:     (+)               Obesity,       Psychiatric/Substance Use:  - neg psychiatric ROS     Infectious Disease:  - neg infectious disease ROS     Malignancy: Comment: Hx/o Melanoma      Other:  - neg other ROS            Physical Exam  Airway  Mallampati: I  TM distance: >3 FB  Neck ROM: full  Mouth opening: >= 4 cm    Cardiovascular - normal exam Comments: ~20 yr history of borderline elevated BP. Per Preoperative H&P 6/2/2025 patient has been on clonidine in the past  -- reported BP in preop eval: 158/105 ([facility], 6/2/2025). Patient was started on Losartan 50mg/d and phone follow up reported normal BP on 6/6/2025   -- DOS 6/11/2025: patient reports BP consistently 120s/70s since starting medication. He has been compliant with losartan. Last took it 6/10/25.   Dental   (+) Minor Abnormalities - some fillings, tiny chips      Pulmonary - normal exam      Neurological - normal exam  He appears awake, alert and oriented x3.    Other Findings       OUTSIDE LABS:  CBC: No results found for: \"WBC\", \"HGB\", \"HCT\", \"PLT\"  BMP: No results found for: \"NA\", \"POTASSIUM\", \"CHLORIDE\", \"CO2\", \"BUN\", \"CR\", \"GLC\"  COAGS: No results found for: \"PTT\", \"INR\", \"FIBR\"  POC: No results found for: \"BGM\", \"HCG\", \"HCGS\"  HEPATIC: No results found for: \"ALBUMIN\", \"PROTTOTAL\", \"ALT\", \"AST\", \"GGT\", \"ALKPHOS\", \"BILITOTAL\", \"BILIDIRECT\", \"ROSA\"  OTHER: No results found for: \"PH\", \"LACT\", \"A1C\", \"BLANCA\", \"PHOS\", \"MAG\", \"LIPASE\", \"AMYLASE\", \"TSH\", \"T4\", \"T3\", \"CRP\", \"SED\"    Anesthesia Plan    ASA Status:  3      NPO Status: NPO Appropriate   Anesthesia Type: General.  Airway: supraglottic airway.  Induction: intravenous.  Maintenance: Balanced. "   Techniques and Equipment:     - Airway:  Planned airway equipment includes supraglottic airway.     - Monitoring Plan: standard ASA monitoring     Consents    Anesthesia Plan(s) and associated risks, benefits, and realistic alternatives discussed. Questions answered and patient/representative(s) expressed understanding.     - Discussed: anesthesiologist, proceduralist     - Discussed with:  Patient        - Pt is DNR/DNI Status: no DNR     Blood Consent:      - Discussed with: not discussed.     Postoperative Care    Pain management: multimodal analgesia.     Comments:                   Ayala Gómez MD    I have reviewed the pertinent notes and labs in the chart from the past 30 days and (re)examined the patient.  Any updates or changes from those notes are reflected in this note.    Clinically Significant Risk Factors Present on Admission                   # Hypertension: Home medication list includes antihypertensive(s)

## 2025-06-11 NOTE — BRIEF OP NOTE
[facility]    Brief Operative Note    Pre-operative diagnosis:   Right shoulder superior labral tear  Right shoulder biceps tendonitis  Right shoulder partial thickness rotator cuff tear    PosRight shoulder superior labral tear  Right shoulder biceps tendonitis  Right shoulder partial thickness rotator cuff teart-operative diagnosis     Procedure:   Right shoulder rotator cuff repair  Right shoulder biceps tenodesis  Right shoulder debridement    Surgeon: Surgeons and Role:     * Zenon Stern MD - Primary     * Luz Frey PA-C - Assisting     * Joel Chase MD - Resident - Assisting  Anesthesia: Choice with Block   Estimated Blood Loss: 10 ml    Drains: None  Specimens: * No specimens in log *  Findings:   None.  Complications: None.  Implants:   Implant Name Type Inv. Item Serial No.  Lot No. LRB No. Used Action   Knee Fiber Button Self Punching    [employer] [id number] Right 1 Implanted   ANCHOR WILDE KNTLSS TGRTP 2.6 FBRTK SOFT BLK STEFANIE WHT [id number] - [id number] Metallic Hardware/[employer] ANCHOR WILDE KNTLSS TGRTP 2.6 FBRTK SOFT BLK STEFANIE WHT AR-[id number]  [employer] [id number] Right 1 Implanted   ANCHOR WILDE KNTLSS TGRTP 2.6 FBRTK SOFT AR-3653SP - [id number] Metallic Hardware/[employer] ANCHOR WILDE KNTLSS TGRTP 2.6 FBRTK SOFT AR-3653SP  ARTH[employer]X [id number] Right 1 Implanted   IMP ANCHOR AR[employer]X BIO-SWIVELOCK [id number] - [id number] Metallic Hardware/[employer] IMP ANCHOR AR[employer]X BIO-SWIVELOCK [id number]  ARTHREX [id number] Right 1 Implanted   IMP ANCHOR [employer] BIO[employer]OCK [id number] - [id number] Metallic Hardware/[employer] IMP ANCHOR ARTHREX BIO-SWIVELOCK 4.99P30HJ [id number]  [employer]X [id number] Right 1 Implanted

## 2025-06-11 NOTE — ANESTHESIA PROCEDURE NOTES
Brachial plexus Procedure Note    Pre-Procedure   Staff -        Anesthesiologist:  Jose Dunne DO       Resident/Fellow: Hasmukh Evans MD       Performed By: resident and with residents       Procedure performed by resident/fellow/CRNA in presence of a teaching physician.         Location: pre-op       Procedure Start/Stop Times: 6/11/2025 10:15 AM and 6/11/2025 10:30 AM       Pre-Anesthestic Checklist: patient identified, IV checked, site marked, risks and benefits discussed, informed consent, monitors and equipment checked, pre-op evaluation, at physician/surgeon's request and post-op pain management  Timeout:       Correct Patient: Yes        Correct Procedure: Yes        Correct Site: Yes        Correct Position: Yes        Correct Laterality: Yes        Site Marked: Yes  Procedure Documentation  Procedure: Brachial plexus         Laterality: right       Patient Position: sitting       Skin prep: Chloraprep (interscalene approach).       Needle Gauge: 21.        Needle Length (millimeters): 110        Ultrasound guided       1. Ultrasound was used to identify targeted nerve, plexus, vascular marker, or fascial plane and place a needle adjacent to it in real-time.       2. Ultrasound was used to visualize the spread of anesthetic in close proximity to the above referenced structure.       3. A permanent image is entered into the patient's record.       4. The visualized anatomic structures appeared normal.       5. There were no apparent abnormal pathologic findings.    Assessment/Narrative         The placement was negative for: blood aspirated, painful injection and site bleeding       Paresthesias: No.       Bolus given via needle. no blood aspirated via catheter.        Secured via.        Insertion/Infusion Method: Single Shot       Complications: none    Medication(s) Administered   Bupivacaine 0.5% w/ 1:200K Epi (Other) - Other   10 mL - 6/11/2025 10:15:00 AM  Bupivacaine liposome (Exparel) 1.3%  "LA inj susp (Infiltration) - Infiltration   10 mL - 6/11/2025 10:15:00 AM  Medication Administration Time: 6/11/2025 10:15 AM     Comments:  Right brachial plexus interscalene nerve block      FOR [facility] ONLY:   Pain Team Contact information: please page the Pain Team Via [employer]. Search \"Pain\". During daytime hours, please page the attending first. At night please page the resident first.      "

## 2025-06-11 NOTE — ANESTHESIA PROCEDURE NOTES
Airway       Patient location during procedure: OR  Staff -        CRNA: Warren Lo APRN CRNA       Performed By: CRNA  Consent for Airway        Urgency: elective  Indications and Patient Condition       Indications for airway management: sergio-procedural       Induction type:intravenous       Mask difficulty assessment: 0 - not attempted    Final Airway Details       Final airway type: supraglottic airway    Supraglottic Airway Details        Type: LMA       Brand: Air-Q       LMA size: 5    Post intubation assessment        Placement verified by: capnometry, equal breath sounds and chest rise        Number of attempts at approach: 1       Number of other approaches attempted: 0       Secured with: tape       Ease of procedure: easy       Dentition: Intact and Unchanged

## 2025-06-11 NOTE — ANESTHESIA CARE TRANSFER NOTE
Patient: Tip Bell    Procedure: Procedure(s):  ARTHROSCOPY, SHOULDER, WITH OPEN SUBPECTORAL BICEPS TENODESIS  ARTHROSCOPY, SHOULDER, WITH ROTATOR CUFF REPAIR VS DEBRIDEMENT       Diagnosis: Chronic right shoulder pain [M25.511, G89.29]  Diagnosis Additional Information: No value filed.    Anesthesia Type:   General     Note:    Oropharynx: oral airway in place and spontaneously breathing  Level of Consciousness: drowsy  Oxygen Supplementation: face mask  Level of Supplemental Oxygen (L/min / FiO2): 8  Independent Airway: airway patency satisfactory and stable  Dentition: dentition unchanged  Vital Signs Stable: post-procedure vital signs reviewed and stable  Report to RN Given: handoff report given  Patient transferred to: PACU    Handoff Report: Identifed the Patient, Identified the Reponsible Provider, Reviewed the pertinent medical history, Discussed the surgical course, Reviewed Intra-OP anesthesia mangement and issues during anesthesia, Set expectations for post-procedure period and Allowed opportunity for questions and acknowledgement of understanding      Vitals:  Vitals Value Taken Time   /83 06/11/25 14:23   Temp     Pulse 58 06/11/25 14:30   Resp 25 06/11/25 14:30   SpO2 100 % 06/11/25 14:30   Vitals shown include unfiled device data.    Electronically Signed By: VALENTE Arreola CRNA  June 11, 2025  2:30 PM

## 2025-06-12 ENCOUNTER — DOCUMENTATION ONLY (OUTPATIENT)
Dept: ORTHOPEDICS | Facility: CLINIC | Age: 50
End: 2025-06-12
Payer: COMMERCIAL

## 2025-06-12 ENCOUNTER — MEDICAL CORRESPONDENCE (OUTPATIENT)
Dept: HEALTH INFORMATION MANAGEMENT | Facility: CLINIC | Age: 50
End: 2025-06-12
Payer: COMMERCIAL

## 2025-06-12 NOTE — PROGRESS NOTES
ATC has faxed operative note from 6/11/2025 and standard rotator cuff repair protocol to [facility] in [place], [place] at [phone].    CHARLES Delaney

## 2025-06-16 DIAGNOSIS — Z98.890 S/P SHOULDER SURGERY: ICD-10-CM

## 2025-06-16 NOTE — TELEPHONE ENCOUNTER
ATC spoke with patient who explained that over the weekend, he was able to get on a good schedule of alternating between Tylenol and Ibuprofen, which he feels is managing his pain well. He is states at this time, he does not need a refill of oxycodone.     He appreciated the return call and had no further questions.    CHARLES Delaney

## 2025-07-01 ENCOUNTER — DOCUMENTATION ONLY (OUTPATIENT)
Dept: ORTHOPEDICS | Facility: CLINIC | Age: 50
End: 2025-07-01
Payer: COMMERCIAL

## 2025-07-01 DIAGNOSIS — Z98.890 S/P ARTHROSCOPY OF RIGHT SHOULDER: ICD-10-CM

## 2025-07-01 DIAGNOSIS — S43.431A SUPERIOR GLENOID LABRUM LESION OF RIGHT SHOULDER: Primary | ICD-10-CM

## 2025-07-18 ENCOUNTER — VIRTUAL VISIT (OUTPATIENT)
Dept: ORTHOPEDICS | Facility: CLINIC | Age: 50
End: 2025-07-18
Payer: COMMERCIAL

## 2025-07-18 DIAGNOSIS — Z98.890 S/P SHOULDER SURGERY: Primary | ICD-10-CM

## 2025-07-18 PROCEDURE — 99024 POSTOP FOLLOW-UP VISIT: CPT | Performed by: ORTHOPAEDIC SURGERY

## 2025-07-18 NOTE — LETTER
7/18/2025      Tip Bell  [address] [place] [address]      Dear Colleague,    Thank you for referring your patient, Tip Bell, to the [facility] [place]. Please see a copy of my visit note below.    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  I am seeing him as a video visit.  He is doing very well.  Very little discomfort.  He is doing his physical therapy with Colby Valenzuela.    Physical Examination:  Incisions well-healed.    Impression:  6-week status post right shoulder rotator cuff repair and biceps tenodesis.  Doing well.    Plan:  We can start to wean him out of his sling.  He does not needed at home or to sleep.  He can put it on for another 2 weeks if he is in a crowd.  We can start to advance his range of motion and physical therapy.  He will follow-up with me in 6 to 8 weeks for routine recheck.    Again, thank you for allowing me to participate in the care of your patient.        Sincerely,        Zenon Stern MD    Electronically signed

## 2025-07-19 NOTE — PROGRESS NOTES
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  I am seeing him as a video visit.  He is doing very well.  Very little discomfort.  He is doing his physical therapy with Colby Valenzuela.    Physical Examination:  Incisions well-healed.    Impression:  6-week status post right shoulder rotator cuff repair and biceps tenodesis.  Doing well.    Plan:  We can start to wean him out of his sling.  He does not needed at home or to sleep.  He can put it on for another 2 weeks if he is in a crowd.  We can start to advance his range of motion and physical therapy.  He will follow-up with me in 6 to 8 weeks for routine recheck.

## (undated) DEVICE — IMM KIT SHOULDER TMAX MASK FACE 7210559

## (undated) DEVICE — DRSG ABDOMINAL 07 1/2X8" 7197D

## (undated) DEVICE — POSITIONER ARMBOARD FOAM CONVOLUTE CP-501

## (undated) DEVICE — TUBING ARTHROSCOPY PUMP ARTHREX AR-6410

## (undated) DEVICE — ESU GROUND PAD ADULT W/CORD E7507

## (undated) DEVICE — SU MONOCRYL 3-0 PS-1 27" Y936H

## (undated) DEVICE — GLOVE BIOGEL PI MICRO SZ 7.5 48575

## (undated) DEVICE — IMM KIT SHOULDER STABILIZATION 7210573

## (undated) DEVICE — DECANTER FLUID L3 IN TRANSFER STRL LF DYNJDEC03

## (undated) DEVICE — Device

## (undated) DEVICE — GLOVE BIOGEL PI SZ 7.5 40875

## (undated) DEVICE — SUTURE TIGERLOOP ARTHX SUTURETAPE NDL NABSB BLK WHT AR-7534T

## (undated) DEVICE — SU NDL MCGOWAN 1/2 1859-6D

## (undated) DEVICE — DRAPE STERI U 1015

## (undated) DEVICE — ARTHROSCOPIC CANNULA TWIST-IN PURPLE 7MMX7CM AR-6570

## (undated) DEVICE — SOLUTION IV IRRIGATION 0.9% NACL 3L R8206

## (undated) DEVICE — ABLATOR ARTHREX APOLLO RF MP90 ASPIRATING 90DEG AR-9811

## (undated) DEVICE — STRAP POSITIONING 60X31" BODY KNEE KBS 01

## (undated) DEVICE — SU VICRYL 0 CT-1 27" UND J260H

## (undated) DEVICE — SU ETHILON 3-0 PS-1 18" 1663H

## (undated) DEVICE — DRSG ADAPTIC 3X3" 6112

## (undated) DEVICE — SU FIBERLINK #2 BRAIDED PB BLUE W/1.5" CLSD LOOP  AR-7235

## (undated) DEVICE — LINEN TOWEL PACK X5 5464

## (undated) DEVICE — NDL 18GA 1.5" 305196

## (undated) DEVICE — SYR 10ML FINGER CONTROL W/O NDL 309695

## (undated) DEVICE — KIT ENDO INST 2.6MM DIA DBL ROW ANCHR W/SPR TROC AR-3650DS

## (undated) DEVICE — ESU PENCIL W/SMOKE EVAC NEPTUNE STRYKER 0703-046-000

## (undated) DEVICE — NDL ARTHREX MULTIFIRE/FASTPASS SCORPION AR-13995N

## (undated) DEVICE — TUBING SUCTION MEDI-VAC 1/4"X20' N620A

## (undated) DEVICE — SYR BULB IRRIG DOVER 60 ML LATEX FREE 67000

## (undated) DEVICE — DRSG STERI STRIP 1/2X4" R1547

## (undated) DEVICE — RESTRAINT LIMB HOLDER ANKLE/WRIST FOAM W/QUICK RELEASE 2533

## (undated) DEVICE — COVER CAMERA IN-LIGHT DISP LT-C02

## (undated) DEVICE — LINEN BACK PACK 5440

## (undated) DEVICE — SOLUTION WATER 1000ML BOTTLE R5000-01

## (undated) DEVICE — SUCTION MANIFOLD NEPTUNE 2 SYS 4 PORT 0702-020-000

## (undated) DEVICE — SOLUTION IRRIGATION 0.9% NACL 1000ML BOTTLE R5200-01

## (undated) DEVICE — TAPE MEDIPORE 6"X2YD 2866

## (undated) DEVICE — SU PDS II 1 CTX 36" Z371T

## (undated) DEVICE — SU FIBERWIRE 2 38"  AR-7200

## (undated) DEVICE — BUR ARTHREX COOLCUT SABRE 4.0MMX13CM AR-8400SR

## (undated) RX ORDER — ACETAMINOPHEN 325 MG/1
TABLET ORAL
Status: DISPENSED
Start: 2025-06-11

## (undated) RX ORDER — FENTANYL CITRATE 50 UG/ML
INJECTION, SOLUTION INTRAMUSCULAR; INTRAVENOUS
Status: DISPENSED
Start: 2025-06-11

## (undated) RX ORDER — LABETALOL HYDROCHLORIDE 5 MG/ML
INJECTION, SOLUTION INTRAVENOUS
Status: DISPENSED
Start: 2025-06-11

## (undated) RX ORDER — PROPOFOL 10 MG/ML
INJECTION, EMULSION INTRAVENOUS
Status: DISPENSED
Start: 2025-06-11

## (undated) RX ORDER — GABAPENTIN 300 MG/1
CAPSULE ORAL
Status: DISPENSED
Start: 2025-06-11